# Patient Record
Sex: MALE | Race: WHITE
[De-identification: names, ages, dates, MRNs, and addresses within clinical notes are randomized per-mention and may not be internally consistent; named-entity substitution may affect disease eponyms.]

---

## 2018-08-16 ENCOUNTER — HOSPITAL ENCOUNTER (EMERGENCY)
Dept: HOSPITAL 25 - UCEAST | Age: 43
Discharge: HOME HEALTH | End: 2018-08-16
Payer: COMMERCIAL

## 2018-08-16 ENCOUNTER — HOSPITAL ENCOUNTER (EMERGENCY)
Dept: HOSPITAL 25 - ED | Age: 43
Discharge: HOME | End: 2018-08-16
Payer: COMMERCIAL

## 2018-08-16 VITALS — SYSTOLIC BLOOD PRESSURE: 150 MMHG | DIASTOLIC BLOOD PRESSURE: 79 MMHG

## 2018-08-16 VITALS — SYSTOLIC BLOOD PRESSURE: 130 MMHG | DIASTOLIC BLOOD PRESSURE: 85 MMHG

## 2018-08-16 DIAGNOSIS — R07.89: Primary | ICD-10-CM

## 2018-08-16 DIAGNOSIS — Z87.891: ICD-10-CM

## 2018-08-16 DIAGNOSIS — Z82.49: ICD-10-CM

## 2018-08-16 DIAGNOSIS — R03.0: ICD-10-CM

## 2018-08-16 DIAGNOSIS — R00.2: ICD-10-CM

## 2018-08-16 LAB
BASOPHILS # BLD AUTO: 0 10^3/UL (ref 0–0.2)
EOSINOPHIL # BLD AUTO: 0 10^3/UL (ref 0–0.6)
HCT VFR BLD AUTO: 42 % (ref 42–52)
HGB BLD-MCNC: 14.3 G/DL (ref 14–18)
LYMPHOCYTES # BLD AUTO: 1.1 10^3/UL (ref 1–4.8)
MCH RBC QN AUTO: 30 PG (ref 27–31)
MCHC RBC AUTO-ENTMCNC: 34 G/DL (ref 31–36)
MCV RBC AUTO: 88 FL (ref 80–94)
MONOCYTES # BLD AUTO: 0.5 10^3/UL (ref 0–0.8)
NEUTROPHILS # BLD AUTO: 9.5 10^3/UL (ref 1.5–7.7)
NRBC # BLD AUTO: 0 10^3/UL
NRBC BLD QL AUTO: 0
PLATELET # BLD AUTO: 207 10^3/UL (ref 150–450)
RBC # BLD AUTO: 4.8 10^6/UL (ref 4–5.4)
WBC # BLD AUTO: 11.2 10^3/UL (ref 3.5–10.8)

## 2018-08-16 PROCEDURE — 36415 COLL VENOUS BLD VENIPUNCTURE: CPT

## 2018-08-16 PROCEDURE — 71045 X-RAY EXAM CHEST 1 VIEW: CPT

## 2018-08-16 PROCEDURE — 83605 ASSAY OF LACTIC ACID: CPT

## 2018-08-16 PROCEDURE — 80053 COMPREHEN METABOLIC PANEL: CPT

## 2018-08-16 PROCEDURE — 99202 OFFICE O/P NEW SF 15 MIN: CPT

## 2018-08-16 PROCEDURE — 82553 CREATINE MB FRACTION: CPT

## 2018-08-16 PROCEDURE — 82550 ASSAY OF CK (CPK): CPT

## 2018-08-16 PROCEDURE — 93005 ELECTROCARDIOGRAM TRACING: CPT

## 2018-08-16 PROCEDURE — 85379 FIBRIN DEGRADATION QUANT: CPT

## 2018-08-16 PROCEDURE — 84484 ASSAY OF TROPONIN QUANT: CPT

## 2018-08-16 PROCEDURE — 84443 ASSAY THYROID STIM HORMONE: CPT

## 2018-08-16 PROCEDURE — 99282 EMERGENCY DEPT VISIT SF MDM: CPT

## 2018-08-16 PROCEDURE — 83735 ASSAY OF MAGNESIUM: CPT

## 2018-08-16 PROCEDURE — 85025 COMPLETE CBC W/AUTO DIFF WBC: CPT

## 2018-08-16 PROCEDURE — G0463 HOSPITAL OUTPT CLINIC VISIT: HCPCS

## 2018-08-16 PROCEDURE — 83880 ASSAY OF NATRIURETIC PEPTIDE: CPT

## 2018-08-16 NOTE — ED
HPI Chest Pain





- HPI Summary


HPI Summary: 


This is scribe Gt Mckenna documenting for attending Moisés Montero MD.    


   


This patient is a 43 year old M presenting to [facility] with a chief complaint of a 

dull, fluctuating L-sided CP since yesterday. The patient rates the pain 2/10 

in severity currently. Patient reports palpitations and feeling like he is near-

syncope this early afternoon. Patient denies SOB, nausea, vomiting, coughing, 

and fevers. Patient currently takes Latanoprost for his glaucoma. He moved to 

this area from [place], [place] about 1.5 months ago. He has a PMHx of glaucoma and 

panic attacks (hasnt had one in over 10 years). He has no PSHx. Patient quit 

smoking 5 years ago, occasionally drinks alcohol, and does not use other 

substances. Patients grandmother  of an MI in her 60s.  





I, Dr. Montero, personally performed the services described in this documentation 

as scribed in my presence, and it is both accurate and complete.





- History of Current Complaint


Chief Complaint: EDChestPainROMI


Time Seen by Provider: 18 18:25


Hx Obtained From: Patient


Onset/Duration: Started Hours Ago - Since yesterday, Still Present


Initial Severity: Moderate


Current Severity: Mild


Pain Intensity: 2


Pain Scale Used: 0-10 Numeric


Chest Pain Location: Left Anterior


Character: Other: - Dull


Aggravating Factor(s): Nothing


Alleviating Factor(s): Nothing


Associated Signs and Symptoms: Positive: Chest Pain - dull and fluctuating, 

Palpitations, Other: - feeling like he is near-syncope.  Negative: Shortness of 

Breath, Fever, Nausea, Cough, Vomiting





- Allergy/Home Medications


Allergies/Adverse Reactions: 


 Allergies











Allergy/AdvReac Type Severity Reaction Status Date / Time


 


No Known Allergies Allergy   Verified 18 17:52














PMH/Surg Hx/FS Hx/Imm Hx


Sensory History: Reports: Hx Glaucoma


   Denies: Hx Deafness


Psychiatric History: Reports: Hx Panic Disorder - Hasn't had a panic attack in 

over 10 years


Infectious Disease History: No


Infectious Disease History: 


   Denies: Traveled Outside the [place] in Last 30 Days





- Family History


Known Family History: Positive: Cardiac Disease - MI in paternal grandmother


   Negative: Diabetes





- Social History


Lives: With Family


Alcohol Use: Occasionally


Substance Use Type: Reports: None


Smoking Status (MU): Former Smoker





Review of Systems


Negative: Fever


Positive: Palpitations, Chest Pain - dull and fluctuating


Negative: Shortness Of Breath, Cough


Positive: Vomiting.  Negative: Nausea


Positive: Syncope - feeling near-syncope


All Other Systems Reviewed And Are Negative: Yes





Physical Exam





- Summary


Physical Exam Summary: 


VITAL SIGNS: Reviewed.


GENERAL: Patient is an obese MALE who is lying comfortable in the stretcher. 

Patient is not in any acute respiratory distress.


HEAD AND FACE: No signs of trauma. No ecchymosis, hematomas or skull 

depressions. No sinus tenderness.


EYES: PERRLA, EOMI x 2, No injected conjunctiva, no nystagmus.


EARS: Hearing grossly intact. Ear canals and tympanic membranes are within 

normal limits.


MOUTH: Oropharynx within normal limits.


NECK: Supple, trachea is midline, no adenopathy, no JVD, no carotid bruit, no c-

spine tenderness, neck with full ROM.


CHEST: Symmetric, no tenderness at palpation


LUNGS: Clear to auscultation bilaterally. No wheezing or crackles.


CVS: Regular rate and rhythm, S1 and S2 present, no murmurs or gallops 

appreciated.


ABDOMEN: Soft, non-tender. No signs of distention. No rebound no guarding, and 

no masses palpated. Bowel sounds are normal.


EXTREMITIES: FROM in all major joints, no edema, no cyanosis or clubbing.


NEURO: Alert and oriented x 3. No acute neurological deficits. Speech is normal 

and follows commands.


SKIN: Dry and warm


Triage Information Reviewed: Yes


Vital Signs On Initial Exam: 


 Initial Vitals











Temp Pulse Resp BP Pulse Ox


 


 98.6 F   89   20   165/89   165 


 


 18 17:47  18 17:47  18 17:47  18 17:47  18 17:47











Vital Signs Reviewed: Yes





Diagnostics





- Vital Signs


 Vital Signs











  Temp Pulse Resp BP Pulse Ox


 


 18 18:32   91  23   96


 


 18 18:30    22  144/81 


 


 18 18:27      97


 


 18 17:47  98.6 F  89  20  165/89  165














- Laboratory


Result Diagrams: 


 18 18:48





 18 18:48


Lab Statement: Any lab studies that have been ordered have been reviewed, and 

results considered in the medical decision making process.





- Radiology


  ** Chest X-Ray


Radiology Interpretation Completed By: ED Physician - No acute cardiopulmonary 

disease. Pending official report.





- EKG


  ** No standard instances


Cardiac Rate: NL - 92 BPM


EKG Rhythm: Sinus Rhythm


EKG Interpretation: 17:56. No ST elevation.





Chest Pain Course/Dx





- Course


Assessment/Plan: Patient is a 43-year-old male who presents to the emergency 

room with a chief complaint of having left sided chest pain.  He reports that 

the pain is a dull ache without any radiation to the right. The pain is now 

very mild.  Blood test results without any significant abnormality except for 

what WBC 11.2, glucose of 108, and troponin 0.01.  The patients d-dimer is 

also negative less than 200.  Therefore have no suspicion for PE.  Second 

troponin is also 0.01 and the EKG shows no elevations.  Therefore the patient 

will be discharged home with follow-up with PCP.  The patient doesnt have 

comorbidities however I think the patient will  benefit from a cardiac stress 

test.  Therefore the patient will be discharged home with follow-up with PCP.  

I discussed all the findings and test results with the patient. Patient was 

instructed to return to the emergency room immediately if any of the symptoms 

return or worsens. Plan of care was discussed with the patient and he 

understands and agrees. All questions were answered at patient satisfaction.  

There were no further complaints or concerns.  Lung exam before discharge: CTA B

/L. Good air exchange. No wheezing or crackles heard. CVS: S1 and S2 present. 

No murmurs appreciated. Patient is alert and oriented x 3. Patient is 

hemodynamically stable. Patient will be discharged home with follow up PCP in 

the next 2-3 days





- Chest Pain


Differential Diagnosis/HQI/PQRI: Acute MI, ACS, Angina, CHF, Chest Wall, GI 

Disease, Lower Respiratory Infection





- Diagnoses


Provider Diagnoses: 


 Atypical chest pain








Discharge





- Sign-Out/Discharge


Documenting (check all that apply): Patient Departure





- Discharge Plan


Condition: Stable


Disposition: HOME


Patient Education Materials:  Chest Pain (ED)


Referrals: 


Rogelio Starr MD [Primary Care Provider] - 3 Days


Additional Instructions: 


FOLLOW UP WITH YOUR PRIMARY CARE PROVIDER WITHIN ONE WEEK FOR HIGH BLOOD 

PRESSURE NOTED TODAY.


RETURN TO THE EMERGENCY DEPARTMENT FOR CHANGING OR WORSENING SYMPTOMS.

## 2018-08-16 NOTE — UC
Cardiac HPI





- HPI Summary


HPI Summary: 


The patient is a 44 y/o M presenting to [facility] c/o dull CP in the left anterior 

chest starting yesterday. He began to feel the pain yesterday, which started 

off as an intermittent pain that wasn't too apparent and seemed like an anxiety 

attack but radiated through to his left forearm. Today, the pain worsened and 

he started feeling his heart racing with palpitations after walking up a large 

hill, although the pain is alleviated by walking around since he is distracted. 

He also has noticed that he has gotten palpitations after a night of drinking a 

beer or two, so he has stopped drinking in the last week. During the 

palpitations he felt SOB, and the pain was more constant and obvious. The pain 

at its worst is rated 7/10 in severity, but is currently rated 4/10. He denies 

nausea, diaphoresis, and calf/ankle pain or swelling. He does not have hx of HTN

, hypercholesterolemia, or diabetes. He has FHx of MI in paternal grandmother. 





- History of Current Complaint


Chief Complaint: UCChestPain


Stated Complaint: CHEST PAIN


Time Seen by Provider: 08/16/18 16:49


Hx Obtained From: Patient


Onset/Duration: Sudden Onset, Lasting Days, Still Present


Timing: Constant


Initial Severity: Mild


Current Severity: Moderate


Pain Intensity: 4


Chest Pain Location: Left Anterior


Character: Fast, Dull/Aching


Aggravating Factor(s): Exertion


Alleviating Factor(s): Other - ambulating


Associated Signs & Symptoms: Positive: Chest Pain, Anxiety, SOB, Palpitations.  

Negative: Diaphoresis, Nausea/Vomiting, Calf Pain/Swelling





- Allergy/Home Medications


Allergies/Adverse Reactions: 


 Allergies











Allergy/AdvReac Type Severity Reaction Status Date / Time


 


No Known Allergies Allergy   Verified 08/16/18 16:50











Home Medications: 


 Home Medications





Latanoprost/Pf [Latanoprost 0.005% Eye Drop]  08/16/18 [History]











PMH/Surg Hx/FS Hx/Imm Hx


Other Endocrine History: NEGATIVE: diabetes


Other Cardiovascular History: NEGATIVE: HTN, hypercholesterolemia





- Surgical History


Surgical History: None





- Family History


Known Family History: Positive: Cardiac Disease - MI in paternal grandmother


   Negative: Diabetes





- Social History


Alcohol Use: Occasionally


Substance Use Type: None


Smoking Status (MU): Former Smoker





Review of Systems


Constitutional: Other - NEGATIVE: diaphoresis


Respiratory: Shortness Of Breath - during palpitations


Cardiovascular: Palpitations, Chest Pain - left anterior chest radiation to 

left forearm


Gastrointestinal: Other - NEGATIVE: nausea


Musculoskeletal: Other: - NEGATIVE: pain or swelling in ankles and calves


Psychological: Anxious


All Other Systems Reviewed And Are Negative: Yes





Physical Exam





- Summary


Physical Exam Summary: 


General: well-appearing, no pain distress, anxious


Skin: warm, color reflects adequate perfusion, dry


Head: normal


Eyes: EOMI, ODALYS


ENT: normal


Neck: supple, nontender


Respiratory: CTA, breath sounds present


Cardiovascular: RRR


Abdomen: soft, nontender


Bowel: present


Musculoskeletal: normal, strength/ROM intact


Neurological: sensory/motor intact, A&O x3


Psychological: affect/mood appropriate


Triage Information Reviewed: Yes


Vital Signs: 


 Initial Vital Signs











Temp  99.8 F   08/16/18 16:47


 


Pulse  109   08/16/18 16:47


 


Resp  18   08/16/18 16:47


 


BP  150/79   08/16/18 16:47


 


Pulse Ox  96   08/16/18 16:47











Vital Signs Reviewed: Yes





Diagnostics





- EKG


EKG Comments: 


taken at 16:30


Cardiac Rate: Tachycardia - 116 BPM


Cardiac Rhythm: Sinus: Normal - Minimal ST depression in lateral leads


Ectopy: None





- Assessment/Plan


Course Of Treatment: Medications reviewed. Allergies noted. BP noted and 

advised to follow up with PCP.  I RECOMMENDED FURTHER EVALUATION NOW IN THE 

EMERGENCY DEPARTMENT.  THE PATIENT DECLINED AMBULANCE TRANSPORT.





- Clinical Impression


Provider Diagnoses: CHEST PAIN.  PALPITATIONS.  Elevated BP without dx of HTN,





Discharge





- Sign-Out/Discharge


Documenting (check all that apply): Patient Departure - Patient will be 

discharged but advised to go to [facility] immediately.





- Discharge Plan


Condition: Stable


Disposition: HOME-RECOMMEND TO ED


Patient Education Materials:  Chest Pain (ED), Heart Palpitations (ED)


Referrals: 


[facility] [Outside]


Additional Instructions: 


GO DIRECTLY TO THE EMERGENCY DEPARTMENT FOR FURTHER EVALUATION OF YOUR CHEST 

PAIN.


Your blood pressure was elevated during todays visit; please follow up with 

your primary care provider within a week for further evaluation.





- Billing Disposition and Condition


Condition: STABLE


Disposition: Home-Recommend to ED





Attestation Statement


Scribe Attestation: 


This is kaleb Wilson documenting for attending Dr. Mathew Sepulveda MD.


User Type: Provider with Scribe


Provider Attestation: The documentation recorded by the scribe accurately 

reflects the service I personally performed and the decisions made by me.

## 2018-08-17 NOTE — RAD
INDICATION: Chest pain



COMPARISON: None

 

TECHNIQUE: An AP portable view obtained at 1922 hours is submitted.



FINDINGS: 



Bones/Soft Tissues: There are no acute bony findings.



Cardiomediastinal: The cardiomediastinal silhouette is normal. 



Lungs: There are no infiltrates.



Pleura: There are no pleural effusions. 



Other: None



IMPRESSION:  NO ACTIVE DISEASE.



R1